# Patient Record
Sex: MALE | Race: WHITE
[De-identification: names, ages, dates, MRNs, and addresses within clinical notes are randomized per-mention and may not be internally consistent; named-entity substitution may affect disease eponyms.]

---

## 2019-09-15 ENCOUNTER — HOSPITAL ENCOUNTER (EMERGENCY)
Dept: HOSPITAL 25 - UCEAST | Age: 59
Discharge: HOME HEALTH | End: 2019-09-15
Payer: COMMERCIAL

## 2019-09-15 ENCOUNTER — HOSPITAL ENCOUNTER (EMERGENCY)
Dept: HOSPITAL 25 - ED | Age: 59
Discharge: HOME | End: 2019-09-15
Payer: COMMERCIAL

## 2019-09-15 VITALS — DIASTOLIC BLOOD PRESSURE: 129 MMHG | SYSTOLIC BLOOD PRESSURE: 178 MMHG

## 2019-09-15 VITALS — DIASTOLIC BLOOD PRESSURE: 120 MMHG | SYSTOLIC BLOOD PRESSURE: 200 MMHG

## 2019-09-15 DIAGNOSIS — I10: Primary | ICD-10-CM

## 2019-09-15 DIAGNOSIS — Z79.899: ICD-10-CM

## 2019-09-15 DIAGNOSIS — M54.5: ICD-10-CM

## 2019-09-15 DIAGNOSIS — L03.116: ICD-10-CM

## 2019-09-15 DIAGNOSIS — I16.0: Primary | ICD-10-CM

## 2019-09-15 DIAGNOSIS — M25.472: ICD-10-CM

## 2019-09-15 LAB
ANION GAP SERPL CALC-SCNC: 8 MMOL/L (ref 2–11)
BASOPHILS # BLD AUTO: 0.1 10^3/UL (ref 0–0.2)
BUN SERPL-MCNC: 17 MG/DL (ref 6–24)
BUN/CREAT SERPL: 15 (ref 8–20)
CALCIUM SERPL-MCNC: 9.9 MG/DL (ref 8.6–10.3)
CHLORIDE SERPL-SCNC: 106 MMOL/L (ref 101–111)
EOSINOPHIL # BLD AUTO: 0.1 10^3/UL (ref 0–0.6)
GLUCOSE SERPL-MCNC: 109 MG/DL (ref 70–100)
HCO3 SERPL-SCNC: 25 MMOL/L (ref 22–32)
HCT VFR BLD AUTO: 50 % (ref 42–52)
HGB BLD-MCNC: 17.2 G/DL (ref 14–18)
LYMPHOCYTES # BLD AUTO: 1.4 10^3/UL (ref 1–4.8)
MCH RBC QN AUTO: 32 PG (ref 27–31)
MCHC RBC AUTO-ENTMCNC: 34 G/DL (ref 31–36)
MCV RBC AUTO: 94 FL (ref 80–94)
MONOCYTES # BLD AUTO: 0.7 10^3/UL (ref 0–0.8)
NEUTROPHILS # BLD AUTO: 4.7 10^3/UL (ref 1.5–7.7)
NRBC # BLD AUTO: 0 10^3/UL
NRBC BLD QL AUTO: 0.2
PLATELET # BLD AUTO: 172 10^3/UL (ref 150–450)
POTASSIUM SERPL-SCNC: 4.2 MMOL/L (ref 3.5–5)
RBC # BLD AUTO: 5.31 10^6 /UL (ref 4.18–5.48)
SODIUM SERPL-SCNC: 139 MMOL/L (ref 135–145)
WBC # BLD AUTO: 7 10^3/UL (ref 3.5–10.8)

## 2019-09-15 PROCEDURE — 85025 COMPLETE CBC W/AUTO DIFF WBC: CPT

## 2019-09-15 PROCEDURE — 99202 OFFICE O/P NEW SF 15 MIN: CPT

## 2019-09-15 PROCEDURE — 80048 BASIC METABOLIC PNL TOTAL CA: CPT

## 2019-09-15 PROCEDURE — 36415 COLL VENOUS BLD VENIPUNCTURE: CPT

## 2019-09-15 PROCEDURE — G0463 HOSPITAL OUTPT CLINIC VISIT: HCPCS

## 2019-09-15 PROCEDURE — 99282 EMERGENCY DEPT VISIT SF MDM: CPT

## 2019-09-15 NOTE — ED
Complex/Multi-Sys Presentation





- HPI Summary


HPI Summary: 





This pt is a 58 Y/O M presenting to [facility] from Urgent care for a HTN.  It was 

at urgent care for left ankle swelling when he is noted to be hypertensive to 

200 systolic.  Patient states he has a history of hypertension many years ago. 

He had a BP of 204/121 while at the urgent care. He denies any fever, headache, 

SO, N/V, and abdominal pain.  He states that he quit his medications 8 years 

ago due to undesirable side effects. He states that he feels normal other 

than L ankle pain that occurred on Friday 9/13/19. He states that his ankle 

became tingly/painful after taking his trash out. He got diagnosed with 

cellulitis at . He states that he has the most pain on his lateral malleolus 

and has swelling, redness, and warmth from the area. He states that he did not 

role his ankle or injure it in anyway.  He states no aggravating or alleviating 

factors. He has a PMHx of HTN and a FHx of HTN. 





- History Of Current Complaint


Chief Complaint: EDHypertension


Time Seen by Provider: 09/15/19 14:46


Hx Obtained From: Patient


Onset/Duration: Sudden Onset, Lasting Days - Friday 9/13/19: ankle swelling 

Today PTA: HTN, Still Present


Timing: Constant


Severity Currently: Mild


Severity Initially: Mild


Location: Pain At: - L lateral ankle


Aggravating Factor(s): palpation


Alleviating Factor(s): nothing


Associated Signs And Symptoms: Positive: Other - L lateral ankle is swollen, red

, and warm to the touch. Black/blue bruising is present HTN: 205/140.  Negative

: Headache, SOB, Chest Pain, Nausea, Vomiting, Abdominal Pain, Fever


Related History: Recent Illness - L Lateral ankle Dx with cellulitis and an 

abscess





- Allergies/Home Medications


Allergies/Adverse Reactions: 


 Allergies











Allergy/AdvReac Type Severity Reaction Status Date / Time


 


No Known Allergies Allergy   Verified 09/15/19 14:28














PMH/Surg Hx/FS Hx/Imm Hx


Previously Healthy: Yes


Endocrine/Hematology History: 


   Denies: Hx Diabetes, Hx Thyroid Disease


Cardiovascular History: Reports: Hx Hypertension - doesn't take meds, hasn't f/

u with 


Respiratory History: 


   Denies: Hx Asthma, Hx Chronic Obstructive Pulmonary Disease (COPD)


GI History: 


   Denies: Hx Ulcer


Infectious Disease History: No


Infectious Disease History: 


   Denies: Hx Hepatitis, Hx Human Immunodeficiency Virus (HIV), Traveled 

Outside the [place] in Last 30 Days





- Family History


Known Family History: Positive: Hypertension





- Social History


Occupation: Employed Full-time


Lives: With Family


Alcohol Use: Weekly


Hx Substance Use: No


Substance Use Type: Reports: None


Hx Tobacco Use: No


Smoking Status (MU): Never Smoked Tobacco





Review of Systems


Negative: Fever


Positive: Other - HTN: 205/140.  Negative: Chest Pain


Negative: Shortness Of Breath


Negative: Abdominal Pain, Vomiting, Nausea


Positive: Other


Skin: Other - Red, swollen, warm area over the L lateral ankle 


Negative: Headache


All Other Systems Reviewed And Are Negative: Yes





Physical Exam





- Summary


Physical Exam Summary: 





Constitutional: Well-developed, Well-nourished, Alert. (-) Distressed


Skin: erythema and ecchymosis to L lateral foot. 


HENT: Normocephalic; Atraumatic


Eyes: Conjunctiva normal


Neck: Musculoskeletal ROM normal neck. (-) JVD, (-) Stridor, (-) Nuchal rigidity


Cardio: Rhythm regular, rate normal, 2+ radial pulse 


Pulmonary/Chest wall: Effort normal. (-) Respiratory distress


Abd: Soft, ND


Musculoskeletal: Diffuse swelling the L ankle and mid foot, ecchymosis to the 

lateral malleolus w surrounding erythema,  full ROM, warm to the touch, good 

pulses 2+


Lymph: (-) Cervical adenopathy


Neuro: Alert, Oriented x3


Psych: Mood and affect Normal





Triage Information Reviewed: Yes


Vital Signs On Initial Exam: 


 Initial Vitals











Temp Pulse Resp BP Pulse Ox


 


 99.1 F   101   16   205/140   97 


 


 09/15/19 14:23  09/15/19 14:23  09/15/19 14:23  09/15/19 14:23  09/15/19 14:23











Vital Signs Reviewed: Yes





Diagnostics





- Vital Signs


 Vital Signs











  Temp Pulse Resp BP Pulse Ox


 


 09/15/19 14:23  99.1 F  101  16  205/140  97














- Laboratory


Result Diagrams: 


 09/15/19 14:53





 09/15/19 14:53


Lab Statement: Any lab studies that have been ordered have been reviewed, and 

results considered in the medical decision making process.





Re-Evaluation





- Re-Evaluation


  ** First Eval


Re-Evaluation Time: 15:38


Change: Improved


Comment: Pt is down to 189 systolically.





Complex Multi-Symp Course/Dx


Course Of Treatment: 60 y/o male w hx HTN p/w elevated BPs, asymptomatic.  -

Based on my eval today, no evidence of end organ damage from hypertension.  -

chemistry wnl,no JABARI noted, no chest pain/sob, no HA, neuro exam non-focal.  

Recommendations for BP management:  -The pt likely suffers from essential 

hypertension.  -In the absence of a hypertensive emergency, which the pt does 

not have, there is no indication to aggressively treat her elevated blood 

pressure, even when it approaches the systolic ~180 range.  -The patient needs 

long term management of her blood pressure.  -acutely, the pt may still have an 

elevated pressure, but over time the medication will take effect.  Regarding 

left ankle swelling, patient does not appear to have septic joint, full range 

of motion of ankle, no white count, area of ecchymosis and swelling could be 

secondary to localized infection versus reaction from possible bite. Patient 

does not report trauma.  Patient already on Keflex, advised to elevate leg and 

return for worsening symptoms.





- Diagnoses


Provider Diagnoses: 


 HTN (hypertension), Ankle swelling








Discharge ED





- Sign-Out/Discharge


Documenting (check all that apply): Patient Departure - discharge


Patient Received Moderate/Deep Sedation with Procedure: No





- Discharge Plan


Condition: Stable


Disposition: HOME


Patient Education Materials:  Cellulitis (ED), Hypertension (ED), Swollen Ankle 

Joint (ED)


Referrals: 


Darell Booker MD [Primary Care Provider] - 2 Days


Additional Instructions: 


You were seen in the emergency department for high blood pressure and ankle 

swelling. Please take lisinopril and follow up with your PCP.  Please continue 

antibiotics and return for worsening pain, inability to walk, fevers, or 

worsening of the swelling of your ankle.


If any studies were not completed at the time of discharge you will be called 

with the relevant results.


Please follow up with your primary care doctor in next 2-3 days and return to 

emergency department for worsening or concerning symptoms.


It was a pleasure taking care of you today.





- Billing Disposition and Condition


Condition: STABLE


Disposition: Home





- Attestation Statements


Document Initiated by Scribe: Yes


Documenting Scribe: Jose Valdez


Provider For Whom Scribe is Documenting (Include Credential): Sigrid Kirkpatrick MD 


Scribe Attestation: 


I, Jose Valdez, scribed for Sigrid Kirkpatrick MD  on 09/15/19 at 1615. 


Scribe Documentation Reviewed: Yes


Provider Attestation: 


The documentation as recorded by the scribeJose accurately reflects 

the service I personally performed and the decisions made by me, Sigrid Kirkpatrick MD 


Status of Scribe Document: Viewed

## 2019-09-15 NOTE — UC
Skin Complaint HPI





- HPI Summary


HPI Summary: 





L ANKLE:pain and welling after either getting cut or scratched or bit by 

something.  Has noticed pain and mild itching but he is concerned at how 

swollen and red it is. nothing makes it better.





LOWER BACK PAIN: chronic and today feels it has exacerbated.  worse w/ movement

, nothing makes it feel better.  has had this before.





HTN: pt reports having high blood pressure for many yrs, doesn't have a pcp and 

has been told to be on medication but did not like amlodipine b/c it made him 

feel 'like a zombie'.  he currently denies any sob, ha, vision changes, 

diaphoresis, abd pain, dizziness, chest pain.





- History of Current Complaint


Chief Complaint: UCSkin


Time Seen by Provider: 09/15/19 12:18


Stated Complaint: RASH


Hx Obtained From: Patient


Pain Intensity: 3


Pain Scale Used: 0-10 Numeric


Location: Discrete - L ankle.


Aggravating Factor(s): Other - walking


Alleviating Factor(s): Nothing


Associated Signs & Symptoms: Negative: Nausea, Vomiting, Weakness, Difficulty 

Breathing, Chest Pain, Abdominal Pain, Lightheadedness, Syncope





- Allergy/Home Medications


Allergies/Adverse Reactions: 


 Allergies











Allergy/AdvReac Type Severity Reaction Status Date / Time


 


No Known Allergies Allergy   Verified 09/15/19 12:25














PMH/Surg Hx/FS Hx/Imm Hx


Previously Healthy: Yes


Cardiovascular History: Hypertension - untreated and dx'd yrs ago per pt





- Surgical History


Surgical History: None





- Family History


Known Family History: Positive: Hypertension





- Social History


Alcohol Use: Weekly


Substance Use Type: None


Smoking Status (MU): Never Smoked Tobacco





- Immunization History


Most Recent Tetanus Shot: unknown





Review of Systems


All Other Systems Reviewed And Are Negative: Yes


Constitutional: Negative: Fever


Skin: Negative: Rash


Eyes: Negative: Blurred Vision


ENT: Negative: Epistaxis


Respiratory: Negative: Shortness Of Breath, Cough


Cardiovascular: Negative: Palpitations, Chest Pain, Other - LE swelling, chest 

pain


Gastrointestinal: Negative: Nausea


Musculoskeletal: Positive: Edema - L ankle only., Other: - INTERMITTENT LOWER 

BACK PAIN FOR MANY YRS.


Neurological: Negative: Headache, Weakness, Paresthesia


Psychological: Negative: Anxious


Is Patient Immunocompromised?: No





Physical Exam


Triage Information Reviewed: Yes


Appearance: Well-Appearing


Vital Signs: 


 Initial Vital Signs











Temp  98.5 F   09/15/19 12:20


 


Pulse  105   09/15/19 12:20


 


Resp  16   09/15/19 12:20


 


BP  0/0   09/15/19 12:20


 


Pulse Ox  99   09/15/19 12:20











Vital Signs Reviewed: Yes


Neck: Positive: Supple


Respiratory Exam: Normal


Cardiovascular: Positive: No Murmur, Pulses Normal - L pedal pulses, Brisk 

Capillary Refill - at toes.  Negative: Other: - no carotid bruits bilat


Abdomen Description: Negative: Pulsatile Mass


Musculoskeletal: Positive: ROM Limited @ - lower back due to pain, Other: - L 

ankle: swelling, edema, erythema and violaceas central area.  Toes are 

unremarkable.


Psychological: Positive: Age Appropriate Behavior





Course/Dx





- Course


Course Of Treatment: 





HTN :  dURING TRIAGE and taking vitals his BP was severely elevated and did not 

go down much after we had him rest and lay down.  He did not have any 

concerning s/sx and I suspect he has had this for quite some time w/out any tx.

  We had long discussion about safety and risk of stroke, MI, or other organ 

damage and I offerd going to ED for eval via ambulance.  He has declined this 

and would like to go by car.  We discussed risks of stroke, mi, or death and 

her verbalized understanding.  He felt it was high due to his back pain and I 

explained regardless of the cause his bp needed urgent attention.  


Priority assessment was blood pressure.  He has not had a pcp in many years and 

I have sent an ACE for him to start with and immediately f/u w/ a pcp. 


Of note there were no other complaints aside from his lower back pain and L 

ankle cellulitis. Reviewed case w/ Dr. Dill.





CELLULITIS:L ankle , unclear source or why but we will tx w/ antibx and ACE 

wrap to help w/ the swelling.  appears to be infectious.





LOWER BACK PAIN: chronic, low priority for him today but will send for 

ibuprofen. 





- Differential Diagnoses - Skin Complaint


Differential Diagnoses: MRSA, Other





- Diagnoses


Provider Diagnosis: 


 Hypertensive emergency without congestive heart failure, Cellulitis and 

abscess of foot, Chronic lower back pain








Discharge ED





- Sign-Out/Discharge


Documenting (check all that apply): Patient Departure


All imaging exams completed and their final reports reviewed: No





- Discharge Plan


Condition: Stable


Disposition: HOME-RECOMMEND TO ED


Prescriptions: 


Cephalexin CAP* [Keflex CAP*] 500 mg PO TID 10 Days #30 cap


Ibuprofen [Ibu] 600 mg PO TID 30 Days #90 tablet


Lisinopril 10 mg PO DAILY 30 Days #30 tablet


Patient Education Materials:  Hypertensive Crisis (ED)


Referrals: 


[facility] [Outside] - 3 Days (htn tx)


Additional Instructions: 


PLEASE GO TO THE EMERGENCY ROOM FOR YOUR BLOOD PRESSURE.





- Billing Disposition and Condition


Condition: STABLE


Disposition: Home-Recommend to ED